# Patient Record
(demographics unavailable — no encounter records)

---

## 2024-10-28 NOTE — PHYSICAL EXAM
[Alert] : alert [No Acute Distress] : in no acute distress [Sclera] : the sclera and conjunctiva were normal [Normal Outer Ear/Nose] : the ears and nose were normal in appearance [Normal Appearance] : the appearance of the neck was normal [No Respiratory Distress] : no respiratory distress [No Acc Muscle Use] : no accessory muscle use [Auscultation Breath Sounds / Voice Sounds] : lungs were clear to auscultation bilaterally [Normal S1, S2] : normal S1 and S2 [Heart Rate And Rhythm] : heart rate was normal and rhythm regular [Bowel Sounds] : normal bowel sounds [Abdomen Tenderness] : non-tender [Abdomen Soft] : soft [No Spinal Tenderness] : no spinal tenderness [No Clubbing, Cyanosis] : no clubbing or cyanosis of the fingernails [Motor Tone] : muscle strength and tone were normal [Normal Color / Pigmentation] : normal skin color and pigmentation [No Focal Deficits] : no focal deficits [Normal Affect] : the affect was normal [Normal Mood] : the mood was normal [Normal Gait] : abnormal gait

## 2024-10-28 NOTE — ASSESSMENT
[FreeTextEntry1] : 1) HTN - improved on recheck today. monitor bp at home, sent bp monitor device to pharmacy. unclear if being treated.   2) Anxiety and depression - improving, continue with lexapro 10mg daily. information for geriatric psychiatrist provided today.   3) HLD - c/w simvastatin 20mg daily, lipid profile today.   4) hx of sciatica, fall and balance problems - c/w PT   5) Forgetfulness - Minicog today 2/5. Finidngs discussed with patient and son. Follow up with for cogntive assessment visit in one month. Records to be faxed over. Labs ordered today. Past imaging of the brain reviewed CT head 2015 , Mild to moderate periventricular and subcortical white matter lucencies probably represent chronic microvascular ischemic changes. There is mild cerebral volume loss with commensurate ventricular prominence.  6) Hepatitis C - tx, reviewed last imaging with no cirrhosis, and not detected in past labs. cmp ordered today.   Continue with healthy meals and snacks, and  physical activity as tolerated for weight maintenance. Prevnar 20 vaccine ordered. Lab ordered as below.  Orders and referral provided as below.   Patient is here for episodic care. All patient questions answered today and understood by patient. Henceforth, Patient to schedule follow up if new symptoms, questions, renewals or health concerns.

## 2024-10-28 NOTE — HISTORY OF PRESENT ILLNESS
[One fall no injury in past year] : Patient reported one fall in the past year without injury [NO] : No [0] : 2) Feeling down, depressed, or hopeless: Not at all (0) [PHQ-2 Negative - No further assessment needed] : PHQ-2 Negative - No further assessment needed [Patient/Caregiver not ready to engage] : , patient/caregiver not ready to engage [Full assistance needed] : Assistance needed managing medications [Walker] : walker [Smoke Detector] : smoke detector [FreeTextEntry1] : PCP Dr. Jude Child (Retired) Pain/Management: ?   Mr. AKSHAT CARRANZA is a 84 year old man with pmhx of Hepatitis C (tx 2019) , hearing loss with hearing aides, HTN,  HLD, OA, lumbar radiculopathy s/p injection,  physical deconditioning, ambulates with walker who presents to establish care. He is here with son Cornelius who helps provided history.    over a year ago. He endorses he initially had lost the purpose in life.  6 months ago, he started Lexapro 10mg daily by neurologist.  He states his mood and depressive thoughts have improved. He has wonderful family, who supports him. He noticed being forgetful at times. He does not recall if taking blood pressure medication and thyroid medications. He has no recent labs.   He needs to update HCP, form provided to discuss with family and fill out next visit.   Meds: Taking escitalopram 10mg, Simvastatin 20mg, Tylenol 1000mg BID, and daily mutlvitamin. He has accidentally taking twice simvastatin.   Social history: , lives alone. Use walker for ambulation.  [FreeTextEntry7] : needs supervision  [[id number]] : 0 [FreeTextEntry4] : HCP form provided today.

## 2024-11-25 NOTE — PHYSICAL EXAM
[General Appearance - Alert] : alert [General Appearance - In No Acute Distress] : in no acute distress [Oriented To Time, Place, And Person] : oriented to person, place, and time [Impaired Insight] : insight and judgment were intact [Affect] : the affect was normal [Person] : oriented to person [Place] : oriented to place [Time] : oriented to time [Remote Intact] : remote memory intact [Registration Intact] : recent registration memory intact [Concentration Intact] : normal concentrating ability [Visual Intact] : visual attention was ~T not ~L decreased [Naming Objects] : no difficulty naming common objects [Repeating Phrases] : no difficulty repeating a phrase [Writing A Sentence] : no difficulty writing a sentence [Fluency] : fluency intact [Comprehension] : comprehension intact [Reading] : reading intact [Current Events] : adequate knowledge of current events [Past History] : adequate knowledge of personal past history [Vocabulary] : adequate range of vocabulary [Date / Time ___ / 5] : date / time [unfilled] / 5 [Place ___ / 5] : place [unfilled] / 5 [Registration ___ / 3] : registration [unfilled] / 3 [Serial Sevens ___/5] : serial sevens [unfilled] / 5 [Naming 2 Objects ___ / 2] : naming two objects [unfilled] / 2 [Repeating a Sentence ___ / 1] : repeating a sentence [unfilled] / 1 [Cranial Nerves Optic (II)] : visual acuity intact bilaterally,  visual fields full to confrontation, pupils equal round and reactive to light [Cranial Nerves Oculomotor (III)] : extraocular motion intact [Cranial Nerves Trigeminal (V)] : facial sensation intact symmetrically [Cranial Nerves Facial (VII)] : face symmetrical [Cranial Nerves Vestibulocochlear (VIII)] : hearing was intact bilaterally [Cranial Nerves Glossopharyngeal (IX)] : tongue and palate midline [Cranial Nerves Accessory (XI - Cranial And Spinal)] : head turning and shoulder shrug symmetric [Cranial Nerves Hypoglossal (XII)] : there was no tongue deviation with protrusion [Motor Tone] : muscle tone was normal in all four extremities [Motor Strength] : muscle strength was normal in all four extremities [No Muscle Atrophy] : normal bulk in all four extremities [Sensation Tactile Decrease] : light touch was intact [Abnormal Walk] : normal gait [Balance] : balance was intact [Short Term Intact] : short term memory impaired [Total Score ___ / 30] : the patient achieved a score of [unfilled] /30 [Writing a Sentence ___ / 1] : write sentence [unfilled] / 1 [3-stage Verbal Command ___ / 3] : three-stage verbal command [unfilled] / 3 [Written Command ___ / 1] : written command [unfilled] / 1 [Copy a Design ___ / 1] : copy a design [unfilled] / 1 [Recall ___ / 3] : recall [unfilled] / 3 [Involuntary Movements] : no involuntary movements were seen [Motor Handedness Right-Handed] : the patient is right hand dominant [Motor Strength Upper Extremities Bilaterally] : strength was normal in both upper extremities [Motor Strength Lower Extremities Bilaterally] : strength was normal in both lower extremities [Romberg's Sign] : Romberg's sign was negtive [Limited Balance] : balance was intact [Past-pointing] : there was no past-pointing [Tremor] : no tremor present [1+] : Ankle jerk left 1+ [Plantar Reflex Right Only] : normal on the right [Plantar Reflex Left Only] : normal on the left [FreeTextEntry4] : Mental Status Exam Presidents: 2/5 Alternating Pattern: ok Spiral: ok Clock: 3/3 Repetition: ok  R/L discrimination on self and examiner: ok Cross-line commands: ok Praxis:  -Motor: ok  -Dynamic/Luria: ok -Ideomotor/Imitation: ok  -Ideational/writing/closing-in: ok -Dressing: ok. [Sclera] : the sclera and conjunctiva were normal [PERRL With Normal Accommodation] : pupils were equal in size, round, reactive to light, with normal accommodation [Extraocular Movements] : extraocular movements were intact [No APD] : no afferent pupillary defect [No SONIDO] : no internuclear ophthalmoplegia [Full Visual Field] : full visual field [Outer Ear] : the ears and nose were normal in appearance [Neck Appearance] : the appearance of the neck was normal [Edema] : there was no peripheral edema [] : no rash

## 2024-11-25 NOTE — ASSESSMENT
[FreeTextEntry1] : Assessment: 85yo RH WM with ongoing mild forgetfulness and depression. Exam benign, mostly amnestic. Concern for depression, but no SI. pt had SI and SP in the past (years), never materialized. He does have support system and looks fwd to spending time with family. Motor, minor limp RLE with sciatica type pain.  Diagnostic Impression: -forgetfulness -depression  Plan: Rule out reversible and vascular causes: -B vitamins, TFT, RPR -MRI brain w/o gael -basic inflammatory labs -Lyme serology -refer to psych (done by Sima as well).

## 2024-11-25 NOTE — HISTORY OF PRESENT ILLNESS
[FreeTextEntry1] : NO COVID. COVID VACCINE FULL.  PMH: 83yo RH WM with HLD, depression, here for STM loss.   HPI: Gradual forgetfulness over the last few months. Wife passed away 1y ago. Seems worse after that. Family very close to him, helping with any needs. But he manages well. Some getting lost @ driving.  -Sleep: ok   -Appetite: controlled, WW, stable weight   -Motor symptoms: R sciatica   -B/B: ok   -Psychiatric symptoms: some depression, once had SI (Rx OD) but did not proceed   -Functional status/Living Situation: lives alone   Briggs Index of [place] in Activities of Daily Livin. Bathing/Showerin 2. Dressin 3. Toiletin 4. Transferrin 5. Continence:1 6: Feedin   TOTAL:6   [place] Instrumental Activities of Daily Living: A. Ability to Use Telephone:1 B. Shoppin C. Food Preparation:1 D. Housekeepin E. Laundry:1 F. Transportation:1 G. 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

## 2024-12-31 NOTE — HISTORY OF PRESENT ILLNESS
[FreeTextEntry1] : NPV: spots of concern [de-identified] : Mr. AKSHAT CARRANZA is a 85 year old M who presents for:   1. Spots of concern: non-itchy, non-painful spots of unspecified age which patient would like to have evaluated    Skin Cancer Hx: No personal history of skin cancer Family Hx: no family history of skin cancer Soc Hx: no history of sunburns; no history of tanning bed use

## 2024-12-31 NOTE — PHYSICAL EXAM
[FreeTextEntry3] : Full body skin exam was performed. The patient is well-appearing, in no acute distress, alert and oriented x 3. Mood and affect are normal. A complete cutaneous examination of the scalp, face, neck, chest, abdomen, back, bilateral arms, bilateral legs, digits, nails, eyelids and lips reveals the following significant findings: Declined exam of buttocks - Scattered well demarcated stuck on appearing brown plaques on the trunk, extremities and face - Several scattered red partially blanching papules on the trunk and extremities. - Fairly uniform and regular brown macules and papules on the trunk and extremities. dermoscopy with benign features - Scattered poorly circumscribed red erythematous rough papules on the forehead and scalp - On the R nasal bridge, an red, eroded stuck-on appearing yellow papule  - On the R scrotum, soft well circumscribed subcutaneous nodules

## 2024-12-31 NOTE — ASSESSMENT
[FreeTextEntry1] : #Actinic Keratoses x5 - We have discussed the nature and usual course of these lesions. The risks/benefits/alternatives of cryo-destruction was explained to the patient which, include but are not limited to redness, swelling, pain, blistering, scar, discoloration of skin, and recurrence. The patient expressed understanding of these risks and agreed to the procedure. - Cryotherapy administered with 2 cycles to actinic keratoses (#5). - The procedure was well tolerated, without complication. We have discussed related skin care. - Consider Efudex at follow up visit pending clinical course given sun-damaged scalp/forehead; also has background of mild erosive pustular dermatosis, can consider clobetasol, but not too bothersome to patient  #Irritated Seborrheic Keratosis, x1, R nasal bridge - We have discussed the nature and usual course of these lesions. The risks/benefits/alternatives of cryo-destruction was explained to the patient which, include but are not limited to redness, swelling, pain, blistering, scar, discoloration of skin, and recurrence. The patient expressed understanding of these risks and agreed to the procedure. - Cryotherapy administered with 2 cycles to actinic keratoses (#1). - The procedure was well tolerated, without complication. We have discussed related skin care. - Patient to follow up for re-evaluation of lesion in 2-3 months given current appearance; most of lesion with dermoscopic features c/w SK but eroded area in direct contact with patient's glasses, will confirm resolved at [place]  #Subq nodules, scrotum chronic, stable - Favor scrotal cysts vs other; discussed the likely nature of these lesions. - Rtc if growing/changing or symptomatic. Histopath for definitive dx, can consider urology eval  #Cherry angiomas #Seborrheic Keratoses #Multiple melanocytic nevi, benign - education, counseling, reassurance - ABCDEs of melanoma reviewed, rtc sooner if changing  FBSE/Healthcare maintenance -Sun protection was discussed. The proper use of broad-spectrum UVA/UVB sunscreens with SPF 30 or greater was reviewed and the need for re-application after swimming or sweating or 2-3 hours was emphasized. We talked about judicious use of clothing and avoidance of peak periods of sun exposure. I made the patient aware of the need for year-round protection. ABCDEs of melanoma were also reviewed. -Self-skin checks were also reviewed, rtc sooner if changed noted -Counseled patient to monitor for changes - FBSE completed today, no lesions concerning for malignancy. Next FBSE due 1 year  Patient may follow up in 8-12 weeks, or sooner PRN if any changes, new or growing lesions

## 2025-02-28 NOTE — HISTORY OF PRESENT ILLNESS
[One fall no injury in past year] : Patient reported one fall in the past year without injury [Completely Independent] : Completely independent. [Patient is independent with] : bathing [] : managing medications [Independent] : managing finances [Walker] : walker [Smoke Detector] : smoke detector [NO] : No [0] : 2) Feeling down, depressed, or hopeless: Not at all (0) [PHQ-2 Negative - No further assessment needed] : PHQ-2 Negative - No further assessment needed [Patient/Caregiver not ready to engage] : , patient/caregiver not ready to engage [Designated Healthcare Proxy] : Designated healthcare proxy [Time Spent: ___ minutes] : Time Spent: [unfilled] minutes [1] : 2) Feeling down, depressed, or hopeless for several days (1) [PHQ-2 Positive] : PHQ-2 Positive [I have developed a follow-up plan documented below in the note.] : I have developed a follow-up plan documented below in the note. [FreeTextEntry1] : AKSHAT CARRANZA is an 86 yo man with PMH of  Hepatitis C (tx 2019) , hearing loss with hearing aides, HTN, HLD, OA, lumbar radiculopathy s/p injection, physical deconditioning and MCI who presents for post hospitalization follow up. Patient is accompanied by his son, Cornelius who provided collateral history.  PCP Dr. Jude Child (Retired) Pain/Management: ?   Hospital Course ([facility] 2/16/25-2/20/25): -presented for dizziness N/V c/f presyncope admitted for symptomatic orthostatic hypotension with N/V/D suggestive for viral gastroenteritis. -Leukocytosis with no fevers or abdominal pain likely reactive. No additional ep diarrhea and tolerated diet.  -GI PCR neg -CT ab/p shows colitis. -Repeat orthostatics after IVF are positive but not symptoms of dizziness. -ordered zofran prn, no other changes to meds -blood work only notable to WBC 12.9 with left shift on d/c -imaging showed Indeterminate liver lesion measuring 3.8 cm that is new since 2019. Evaluation with contrast enhanced abdominal MRI is recommended.  Since being home, patient states that he is feeling well. Has had no episodes of N/V/D. Has not required zofran. Does mention feeling down/depressed since the loss of his wife 1.5 years ago. States that he has little interest in anything and that he eats because he knows he has to but does not have any appetite. Denies SI. Patient is currently on lexapro 10mg qd. [[id number]] : 2 [AdvancecareDate] : 11/24 [FreeTextEntry4] : Assigns lisa Shields ([phone])as primary HCP and Luiz ([phone]) alternative.

## 2025-02-28 NOTE — REASON FOR VISIT
[Post Hospitalization] : a post hospitalization visit [Family Member] : family member [FreeTextEntry3] : Cornelius gonzalez

## 2025-02-28 NOTE — PHYSICAL EXAM
Addended by: DAWN JIANG on: 8/12/2019 09:01 AM     Modules accepted: Orders     [Alert] : alert [Sclera] : the sclera and conjunctiva were normal [Normal Outer Ear/Nose] : the ears and nose were normal in appearance [Normal Appearance] : the appearance of the neck was normal [No Respiratory Distress] : no respiratory distress [No Acc Muscle Use] : no accessory muscle use [Auscultation Breath Sounds / Voice Sounds] : lungs were clear to auscultation bilaterally [Normal S1, S2] : normal S1 and S2 [Heart Rate And Rhythm] : heart rate was normal and rhythm regular [Bowel Sounds] : normal bowel sounds [Abdomen Tenderness] : non-tender [Abdomen Soft] : soft [No Spinal Tenderness] : no spinal tenderness [No Clubbing, Cyanosis] : no clubbing or cyanosis of the fingernails [Motor Tone] : muscle strength and tone were normal [Normal Color / Pigmentation] : normal skin color and pigmentation [No Focal Deficits] : no focal deficits [Normal Affect] : the affect was normal [Normal Mood] : the mood was normal [___ / 5] : Visuospatial / Executive: [unfilled] / 5 [___ / 3] : Attention (Serial 7 subtraction): [unfilled] / 3 [___ / 1] : Fluency: [unfilled] / 1 [___ / 2] : Abstraction: [unfilled] / 2 [___ / 5] : Delayed Recall: [unfilled] / 5 [___ / 6] : Orientation: [unfilled] / 6 [EOMI] : extraocular movements were intact [Normal Oral Mucosa] : normal oral mucosa [Both Tympanic Membranes Were Examined] : both tympanic membranes were normal [Supple] : the neck was supple [Respiration, Rhythm And Depth] : normal respiratory rhythm and effort [Edema] : edema was not present [Cervical Lymph Nodes Enlarged Posterior Bilaterally] : posterior cervical [Cervical Lymph Nodes Enlarged Anterior Bilaterally] : anterior cervical, supraclavicular [MocaTotal] : 23 [FreeTextEntry1] : 11/13/2024 [Normal Gait] : abnormal gait

## 2025-02-28 NOTE — REVIEW OF SYSTEMS
[As noted in HPI] : as noted in HPI [Arthralgias] : arthralgias [Limb Pain] : limb pain [As Noted in HPI] : as noted in HPI [Depression] : depression [Negative] : Heme/Lymph [Feeling Poorly] : not feeling poorly [Feeling Tired] : not feeling tired [Discharge From Eyes] : no purulent discharge from the eyes [Dry Eyes] : no dryness of the eyes [Nasal Discharge] : no nasal discharge [Sore Throat] : no sore throat [Chest Pain] : no chest pain [Palpitations] : no palpitations [Shortness Of Breath] : no shortness of breath [Wheezing] : no wheezing [Cough] : no cough [SOB on Exertion] : no shortness of breath during exertion [Abdominal Pain] : no abdominal pain [Vomiting] : no vomiting [Constipation] : no constipation [Diarrhea] : no diarrhea [Dysuria] : no dysuria [Skin Wound] : no skin wound [Dizziness] : no dizziness [Suicidal] : not suicidal [Anxiety] : no anxiety

## 2025-02-28 NOTE — DATA REVIEWED
[FreeTextEntry1] : ACC: [id number]     EXAM:  CT ABDOMEN AND PELVIS IC   ORDERED BY:  JOHANNA GALEANO  PROCEDURE DATE:  02/19/2025    INTERPRETATION:  CLINICAL INFORMATION: Nausea and vomiting, loose stools, lower abdominal pain.  COMPARISON: CT abdomen and pelvis 1/12/2019.  CONTRAST/COMPLICATIONS: IV Contrast: Omnipaque 350  90 cc administered   10 cc discarded Oral Contrast: NONE   PROCEDURE: CT of the Abdomen and Pelvis was performed. Sagittal and coronal reformats were performed.  FINDINGS: LOWER CHEST: Right middle lobe nodule measuring 4 mm (3:5). Mild bibasilar atelectasis. Small air-filled cystic spaces in the posterior periphery of the bilateral lower lobes. Coronary artery calcification. Mild bilateral gynecomastia.  LIVER: Indeterminate lesion demonstrating predominantly mild hypodensity with mild peripheral hyperattenuation located in segment 4b, measures 3.8 x 3.0 cm (3:42), new since 1/12/2019. A focus of hyperattenuation in segment 5/6 measuring 1.6 cm is more conspicuous since 1/12/2019, but is not significantly changed in size (3:43). BILE DUCTS: Normal caliber. GALLBLADDER: Within normal limits. SPLEEN: Within normal limits. PANCREAS: Within normal limits. ADRENALS: Within normal limits. KIDNEYS/URETERS: Bilateral renal cysts and subcentimeter hypodense foci that are too small to characterize. No hydronephrosis.  BLADDER: Bladder stone measuring 8 mm. REPRODUCTIVE ORGANS: Enlarged prostate.  BOWEL: Colonic diverticulosis. Moderate to marked mural thickening extending from the splenic flexure to the mid sigmoid colon with adjacent fat infiltration, concerning for colitis. No evidence of pneumatosis. No bowel obstruction. PERITONEUM/RETROPERITONEUM: Trace ascites. VESSELS: Atherosclerotic changes. No evidence of portal or mesenteric venous gas. LYMPH NODES: No lymphadenopathy. ABDOMINAL WALL: Small fat-containing umbilical hernia. Small fat-containing right inguinal hernia. BONES: Degenerative changes. Grade 1 retrolisthesis of L1 over L2. Grade 1 anterolisthesis of L4 over L5.  IMPRESSION: *  Moderate to marked mural thickening with adjacent fat infiltration extending from the splenic flexure to the mid sigmoid colon, concerning for colitis. *  Indeterminate liver lesion measuring 3.8 cm that is new since 2019. Evaluation with contrast enhanced abdominal MRI is recommended.     --- End of Report ---     JOHN BUCK MD; Resident Radiologist This document has been electronically signed. MÓNICA MELENDEZ MD; Attending Radiologist This document has been electronically signed. Feb 19 2025  3:40PM  ACC: [id number]     EXAM:  CT ANGIO BRAIN (W)AW IC   ORDERED BY: DEVYN TIDWELL  ACC: [id number]     EXAM:  CT ANGIO NECK (W)AW IC   ORDERED BY: DEVYN TIDWELL  ACC: [id number]     EXAM:  CT BRAIN   ORDERED BY: DEVYN TIDWELL  PROCEDURE DATE:  02/16/2025    INTERPRETATION:  CLINICAL INFORMATION: Lightheadedness/dizziness with vomiting. Evaluate for CVA  COMPARISON: CT head 8/3/2015  CONTRAST: IV Contrast: Omnipaque 300 70 cc administered  30 cc discarded (accession [id number]), 30 cc discarded (accession [id number]), 30 30 cc discarded (accession [id number]) .  TECHNIQUE: CT BRAIN: Serial axial images were obtained from the skull base to the vertex without the use of contrast.  CTA NECK/HEAD: After the intravenous power injection of non-ionic contrast material, serial thin sections were obtained through the cervical and intracranial circulation on a multislice CT scanner. Axial, Coronal and Sagittal MIP reformatted images were obtained. 3D reconstruction was also performed.  FINDINGS:  CT BRAIN:  VENTRICLES AND SULCI: Normal in size and configuration. INTRA-AXIAL: No mass effect, acute hemorrhage, or midline shift. Gray-white matter differentiation is grossly preserved. There are confluent periventricular and subcortical white matter hypodensities, consistent with moderate microvascular type changes. EXTRA-AXIAL: No mass or fluid collection. Basal cisterns are normal in appearance.  VISUALIZED SINUSES:  Clear. TYMPANOMASTOID CAVITIES:  Clear. VISUALIZED ORBITS: Bilateral lens replacement. CALVARIUM: Intact.  MISCELLANEOUS: None.   CTA NECK:  AORTIC ARCH AND VISUALIZED GREAT VESSELS: Within normal limits.  RIGHT: COMMON CAROTID ARTERY: No significant stenosis to the carotid bifurcation. INTERNAL CAROTID ARTERY: No significant stenosis based on NASCET criteria. VERTEBRAL ARTERY: Normal in course and caliber to the intracranial circulation.  LEFT: COMMON CAROTID ARTERY: No significant stenosis to the carotid bifurcation. INTERNAL CAROTID ARTERY: No significant stenosis based on NASCET criteria. VERTEBRAL ARTERY: Normal in course and caliber to the intracranial circulation.  VISUALIZED LUNGS: Clear.  MISCELLANEOUS: None.  CAROTID STENOSIS REFERENCE: Percent (%) stenosis is expressed in terms of NASCET Criteria. (NASCET = 100x1-(N/D)). N=greatest narrowing. D=normal distal diameter - MILD = <50% stenosis. - MODERATE = 50-69% stenosis. - SEVERE = 70-89% stenosis. - HAIRLINE/CRITICAL = 90-99% stenosis. - OCCLUDED = 100% stenosis.   CTA HEAD:  INTERNAL CAROTID ARTERIES: Bilateral petrous, precavernous, cavernous, and supraclinoid regions are patent without significant stenosis.  [place]: No aneurysm identified. Tiny aneurysms can be beyond the resolution of CTA technique.  ANTERIOR CEREBRAL ARTERIES: No significant stenosis or occlusion. MIDDLE CEREBRAL ARTERIES: No significant stenosis or occlusion. POSTERIOR CEREBRAL ARTERIES: No significant stenosis or occlusion.  DISTAL VERTEBRAL / BASILAR ARTERIES: No significant stenosis or occlusion.  VENOUS STRUCTURES: Visualized superficial and deep venous structures appear patent.  MISCELLANEOUS: No other vascular abnormality is seen.   IMPRESSION:  CT HEAD: No acute intracranial hemorrhage, mass effect, or evidence of acute vascular territorial infarction. If clinical symptoms persist or worsen, more sensitive evaluation with brain MRI may be obtained, if no contraindications exist.  CTA NECK: No evidence of significant stenosis or occlusion.  CTA HEAD: No large vessel occlusion, significant stenosis or vascular abnormality identified.    --- End of Report ---     MEME CISNEROS MD; Resident Radiologist This document has been electronically signed. MIYA PHILLIPS MD; Attending Radiologist This document has been electronically signed. Feb 16 2025  9:37PM  CC: [id number]     EXAM:  XR CHEST AP OR PA 1V   ORDERED BY: DEVYN TIDWELL  PROCEDURE DATE:  02/16/2025    INTERPRETATION:  EXAMINATION: XR CHEST  CLINICAL INDICATION: Dizziness and vomiting. Evaluate for pneumonia  TECHNIQUE: Frontal portable view of the chest was obtained.  COMPARISON: Chest x-ray 1/23/2024  FINDINGS:  The heart is normal in size. No focal consolidation. There is no pneumothorax or pleural effusion. No acute bony abnormality.  IMPRESSION: Clear lungs.  --- End of Report ---     MEME CISNEROS MD; Resident Radiologist This document has been electronically signed. NINA GAMEZ MD; Attending Radiologist This document has been electronically signed. Feb 17 2025  8:41AM

## 2025-02-28 NOTE — HISTORY OF PRESENT ILLNESS
[One fall no injury in past year] : Patient reported one fall in the past year without injury [Completely Independent] : Completely independent. [Patient is independent with] : bathing [] : managing medications [Independent] : managing finances [Walker] : walker [Smoke Detector] : smoke detector [NO] : No [0] : 2) Feeling down, depressed, or hopeless: Not at all (0) [PHQ-2 Negative - No further assessment needed] : PHQ-2 Negative - No further assessment needed [Patient/Caregiver not ready to engage] : , patient/caregiver not ready to engage [Designated Healthcare Proxy] : Designated healthcare proxy [Time Spent: ___ minutes] : Time Spent: [unfilled] minutes [1] : 2) Feeling down, depressed, or hopeless for several days (1) [PHQ-2 Positive] : PHQ-2 Positive [I have developed a follow-up plan documented below in the note.] : I have developed a follow-up plan documented below in the note. [FreeTextEntry1] : AKSHAT CARRANZA is an 84 yo man with PMH of  Hepatitis C (tx 2019) , hearing loss with hearing aides, HTN, HLD, OA, lumbar radiculopathy s/p injection, physical deconditioning and MCI who presents for post hospitalization follow up. Patient is accompanied by his son, Cornelius who provided collateral history.  PCP Dr. Jude Child (Retired) Pain/Management: ?   Hospital Course ([facility] 2/16/25-2/20/25): -presented for dizziness N/V c/f presyncope admitted for symptomatic orthostatic hypotension with N/V/D suggestive for viral gastroenteritis. -Leukocytosis with no fevers or abdominal pain likely reactive. No additional ep diarrhea and tolerated diet.  -GI PCR neg -CT ab/p shows colitis. -Repeat orthostatics after IVF are positive but not symptoms of dizziness. -ordered zofran prn, no other changes to meds -blood work only notable to WBC 12.9 with left shift on d/c -imaging showed Indeterminate liver lesion measuring 3.8 cm that is new since 2019. Evaluation with contrast enhanced abdominal MRI is recommended.  Since being home, patient states that he is feeling well. Has had no episodes of N/V/D. Has not required zofran. Does mention feeling down/depressed since the loss of his wife 1.5 years ago. States that he has little interest in anything and that he eats because he knows he has to but does not have any appetite. Denies SI. Patient is currently on lexapro 10mg qd. [[id number]] : 2 [AdvancecareDate] : 11/24 [FreeTextEntry4] : Assigns lisa Shields ([phone])as primary HCP and Luiz ([phone]) alternative.

## 2025-02-28 NOTE — DATA REVIEWED
[FreeTextEntry1] : ACC: [id number]     EXAM:  CT ABDOMEN AND PELVIS IC   ORDERED BY:  JOHANNA GALEANO  PROCEDURE DATE:  02/19/2025    INTERPRETATION:  CLINICAL INFORMATION: Nausea and vomiting, loose stools, lower abdominal pain.  COMPARISON: CT abdomen and pelvis 1/12/2019.  CONTRAST/COMPLICATIONS: IV Contrast: Omnipaque 350  90 cc administered   10 cc discarded Oral Contrast: NONE   PROCEDURE: CT of the Abdomen and Pelvis was performed. Sagittal and coronal reformats were performed.  FINDINGS: LOWER CHEST: Right middle lobe nodule measuring 4 mm (3:5). Mild bibasilar atelectasis. Small air-filled cystic spaces in the posterior periphery of the bilateral lower lobes. Coronary artery calcification. Mild bilateral gynecomastia.  LIVER: Indeterminate lesion demonstrating predominantly mild hypodensity with mild peripheral hyperattenuation located in segment 4b, measures 3.8 x 3.0 cm (3:42), new since 1/12/2019. A focus of hyperattenuation in segment 5/6 measuring 1.6 cm is more conspicuous since 1/12/2019, but is not significantly changed in size (3:43). BILE DUCTS: Normal caliber. GALLBLADDER: Within normal limits. SPLEEN: Within normal limits. PANCREAS: Within normal limits. ADRENALS: Within normal limits. KIDNEYS/URETERS: Bilateral renal cysts and subcentimeter hypodense foci that are too small to characterize. No hydronephrosis.  BLADDER: Bladder stone measuring 8 mm. REPRODUCTIVE ORGANS: Enlarged prostate.  BOWEL: Colonic diverticulosis. Moderate to marked mural thickening extending from the splenic flexure to the mid sigmoid colon with adjacent fat infiltration, concerning for colitis. No evidence of pneumatosis. No bowel obstruction. PERITONEUM/RETROPERITONEUM: Trace ascites. VESSELS: Atherosclerotic changes. No evidence of portal or mesenteric venous gas. LYMPH NODES: No lymphadenopathy. ABDOMINAL WALL: Small fat-containing umbilical hernia. Small fat-containing right inguinal hernia. BONES: Degenerative changes. Grade 1 retrolisthesis of L1 over L2. Grade 1 anterolisthesis of L4 over L5.  IMPRESSION: *  Moderate to marked mural thickening with adjacent fat infiltration extending from the splenic flexure to the mid sigmoid colon, concerning for colitis. *  Indeterminate liver lesion measuring 3.8 cm that is new since 2019. Evaluation with contrast enhanced abdominal MRI is recommended.     --- End of Report ---     JOHN BUCK MD; Resident Radiologist This document has been electronically signed. MÓNICA MELENDEZ MD; Attending Radiologist This document has been electronically signed. Feb 19 2025  3:40PM  ACC: [id number]     EXAM:  CT ANGIO BRAIN (W)AW IC   ORDERED BY: DEVYN TIDWELL  ACC: [id number]     EXAM:  CT ANGIO NECK (W)AW IC   ORDERED BY: DEVYN TIDWELL  ACC: [id number]     EXAM:  CT BRAIN   ORDERED BY: DEVYN TIDWELL  PROCEDURE DATE:  02/16/2025    INTERPRETATION:  CLINICAL INFORMATION: Lightheadedness/dizziness with vomiting. Evaluate for CVA  COMPARISON: CT head 8/3/2015  CONTRAST: IV Contrast: Omnipaque 300 70 cc administered  30 cc discarded (accession [id number]), 30 cc discarded (accession [id number]), 30 30 cc discarded (accession [id number]) .  TECHNIQUE: CT BRAIN: Serial axial images were obtained from the skull base to the vertex without the use of contrast.  CTA NECK/HEAD: After the intravenous power injection of non-ionic contrast material, serial thin sections were obtained through the cervical and intracranial circulation on a multislice CT scanner. Axial, Coronal and Sagittal MIP reformatted images were obtained. 3D reconstruction was also performed.  FINDINGS:  CT BRAIN:  VENTRICLES AND SULCI: Normal in size and configuration. INTRA-AXIAL: No mass effect, acute hemorrhage, or midline shift. Gray-white matter differentiation is grossly preserved. There are confluent periventricular and subcortical white matter hypodensities, consistent with moderate microvascular type changes. EXTRA-AXIAL: No mass or fluid collection. Basal cisterns are normal in appearance.  VISUALIZED SINUSES:  Clear. TYMPANOMASTOID CAVITIES:  Clear. VISUALIZED ORBITS: Bilateral lens replacement. CALVARIUM: Intact.  MISCELLANEOUS: None.   CTA NECK:  AORTIC ARCH AND VISUALIZED GREAT VESSELS: Within normal limits.  RIGHT: COMMON CAROTID ARTERY: No significant stenosis to the carotid bifurcation. INTERNAL CAROTID ARTERY: No significant stenosis based on NASCET criteria. VERTEBRAL ARTERY: Normal in course and caliber to the intracranial circulation.  LEFT: COMMON CAROTID ARTERY: No significant stenosis to the carotid bifurcation. INTERNAL CAROTID ARTERY: No significant stenosis based on NASCET criteria. VERTEBRAL ARTERY: Normal in course and caliber to the intracranial circulation.  VISUALIZED LUNGS: Clear.  MISCELLANEOUS: None.  CAROTID STENOSIS REFERENCE: Percent (%) stenosis is expressed in terms of NASCET Criteria. (NASCET = 100x1-(N/D)). N=greatest narrowing. D=normal distal diameter - MILD = <50% stenosis. - MODERATE = 50-69% stenosis. - SEVERE = 70-89% stenosis. - HAIRLINE/CRITICAL = 90-99% stenosis. - OCCLUDED = 100% stenosis.   CTA HEAD:  INTERNAL CAROTID ARTERIES: Bilateral petrous, precavernous, cavernous, and supraclinoid regions are patent without significant stenosis.  [place]: No aneurysm identified. Tiny aneurysms can be beyond the resolution of CTA technique.  ANTERIOR CEREBRAL ARTERIES: No significant stenosis or occlusion. MIDDLE CEREBRAL ARTERIES: No significant stenosis or occlusion. POSTERIOR CEREBRAL ARTERIES: No significant stenosis or occlusion.  DISTAL VERTEBRAL / BASILAR ARTERIES: No significant stenosis or occlusion.  VENOUS STRUCTURES: Visualized superficial and deep venous structures appear patent.  MISCELLANEOUS: No other vascular abnormality is seen.   IMPRESSION:  CT HEAD: No acute intracranial hemorrhage, mass effect, or evidence of acute vascular territorial infarction. If clinical symptoms persist or worsen, more sensitive evaluation with brain MRI may be obtained, if no contraindications exist.  CTA NECK: No evidence of significant stenosis or occlusion.  CTA HEAD: No large vessel occlusion, significant stenosis or vascular abnormality identified.    --- End of Report ---     MEME CISNEROS MD; Resident Radiologist This document has been electronically signed. MIYA PHILLIPS MD; Attending Radiologist This document has been electronically signed. Feb 16 2025  9:37PM  CC: [id number]     EXAM:  XR CHEST AP OR PA 1V   ORDERED BY: DEVYN TIDWELL  PROCEDURE DATE:  02/16/2025    INTERPRETATION:  EXAMINATION: XR CHEST  CLINICAL INDICATION: Dizziness and vomiting. Evaluate for pneumonia  TECHNIQUE: Frontal portable view of the chest was obtained.  COMPARISON: Chest x-ray 1/23/2024  FINDINGS:  The heart is normal in size. No focal consolidation. There is no pneumothorax or pleural effusion. No acute bony abnormality.  IMPRESSION: Clear lungs.  --- End of Report ---     MEME CISNEROS MD; Resident Radiologist This document has been electronically signed. NINA GAMEZ MD; Attending Radiologist This document has been electronically signed. Feb 17 2025  8:41AM

## 2025-02-28 NOTE — PHYSICAL EXAM
[Alert] : alert [Sclera] : the sclera and conjunctiva were normal [Normal Outer Ear/Nose] : the ears and nose were normal in appearance [Normal Appearance] : the appearance of the neck was normal [No Respiratory Distress] : no respiratory distress [No Acc Muscle Use] : no accessory muscle use [Auscultation Breath Sounds / Voice Sounds] : lungs were clear to auscultation bilaterally [Normal S1, S2] : normal S1 and S2 [Heart Rate And Rhythm] : heart rate was normal and rhythm regular [Bowel Sounds] : normal bowel sounds [Abdomen Tenderness] : non-tender [Abdomen Soft] : soft [No Spinal Tenderness] : no spinal tenderness [No Clubbing, Cyanosis] : no clubbing or cyanosis of the fingernails [Motor Tone] : muscle strength and tone were normal [Normal Color / Pigmentation] : normal skin color and pigmentation [No Focal Deficits] : no focal deficits [Normal Affect] : the affect was normal [Normal Mood] : the mood was normal [___ / 5] : Visuospatial / Executive: [unfilled] / 5 [___ / 3] : Attention (Serial 7 subtraction): [unfilled] / 3 [___ / 1] : Fluency: [unfilled] / 1 [___ / 2] : Abstraction: [unfilled] / 2 [___ / 5] : Delayed Recall: [unfilled] / 5 [___ / 6] : Orientation: [unfilled] / 6 [EOMI] : extraocular movements were intact [Normal Oral Mucosa] : normal oral mucosa [Both Tympanic Membranes Were Examined] : both tympanic membranes were normal [Supple] : the neck was supple [Respiration, Rhythm And Depth] : normal respiratory rhythm and effort [Edema] : edema was not present [Cervical Lymph Nodes Enlarged Posterior Bilaterally] : posterior cervical [Cervical Lymph Nodes Enlarged Anterior Bilaterally] : anterior cervical, supraclavicular [MocaTotal] : 23 [FreeTextEntry1] : 11/13/2024 [Normal Gait] : abnormal gait
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
## 2025-03-27 NOTE — REVIEW OF SYSTEMS
[Diarrhea: Grade 0] : Diarrhea: Grade 0 [Negative] : Allergic/Immunologic [Depression] : depression [de-identified] : memory issues

## 2025-03-27 NOTE — REASON FOR VISIT
[Initial Consultation] : an initial consultation [FreeTextEntry2] : Suspicious liver lesion concerning for cholangiocarcinoma

## 2025-03-27 NOTE — REVIEW OF SYSTEMS
[Diarrhea: Grade 0] : Diarrhea: Grade 0 [Negative] : Allergic/Immunologic [Depression] : depression [de-identified] : memory issues

## 2025-03-28 NOTE — HISTORY OF PRESENT ILLNESS
[FreeTextEntry1] : fu AKs, penile growth  [de-identified] : AKSHAT CARRANZA is a 85-year-old male who presents for eval of:   1. AKs on the scalp, LV 12/2024 had FBSE and several AKs tx with LN2  2. Lesion in the groin, present ~1 year, slightly itchy   No personal or family hx skin CA

## 2025-03-28 NOTE — ASSESSMENT
[FreeTextEntry1] : # Actinic Keratoses, scalp - Discussed the nature and usual course of these lesions  - Discussed treatment options and r/b/a of treatment  - Will treat with LN2 today    Procedure note: Cryotherapy  Verbal consent obtained. The risks/benefits/alternatives discussed including but not limited to risk of pain, inflammatory and blistering reaction, infection, risk of permanent scar and/or dyspigmentation, recurrence, possible lack of efficacy and need for repeat treatments. Sites confirmed. 4 lesion(s) treated with cryotherapy: liquid nitrogen x2 rapid freeze-slow thaw cycles. Discussed wound care instructions. Patient tolerated well with no immediate complications.  # Neoplasms of Uncertain Behavior 1) Left scrotum, ddx condyloma vs SK 2) Vertex scalp, ddx hypertrophic AK vs SCCis - Recommend skin biopsy to help confirm diagnosis. See procedure note below - Wound care instructions given. Recommended OTC vaseline for wound care  Procedure: Shave bx  1) Left scrotum 2) Vertex scalp  The risks/benefits/alternatives of skin biopsy were explained to the patient, which include and are not limited to bleeding, infection, scarring or discoloration of skin, and recurrence of lesion. Patient expressed understanding of these risks and provided consent to the procedure. Time out with verification of patient and lesion site was performed. Site was prepped with rubbing alcohol, lidocaine with epinephrine was injected for anesthesia, and biopsy was performed. Specimen sent to path. Procedure was without complication and well tolerated. Wound care was discussed.  #Lentigines #SKs - education, counseling, reassurance - otc spf 30+  RTC 6 months or sooner PRN pending bx results
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
## 2025-04-02 NOTE — REASON FOR VISIT
[Consultation] : a consultation visit [Home] : at home, [unfilled] , at the time of the visit. [Medical Office: ([facility])___] : at the medical office located in  [Verbal consent obtained from patient] : the patient, [unfilled] [This encounter was initiated by telehealth (audio with video) and converted to telephone (audio only)] : This encounter was initiated by telehealth (audio with video) and converted to telephone (audio only) [Technical] : patient unable to effectively utilize tele-video due to technical issues. [FreeTextEntry1] : targeted liver lesion biopsy

## 2025-04-02 NOTE — PHYSICAL EXAM
[Alert] : alert [No Acute Distress] : no acute distress [Well Nourished] : well nourished [Well Developed] : well developed [Healthy Appearance] : healthy appearance [Normal Voice/Communication] : normal voice communication [No Respiratory Distress] : no respiratory distress [Oriented x3] : oriented to person, place, and time [Normal Insight/Judgement] : insight and judgment were intact [Normal Affect] : the affect was normal [Normal Mood] : the mood was normal [Ambulatory and capable of all selfcare but unable to carry out any work activities] : Ambulatory and capable of all selfcare but unable to carry out any work activities. Up and about more than 50% of waking hours [de-identified] : + memory issues since wife passed, forgetful about timing of events

## 2025-04-02 NOTE — ASSESSMENT
[Other: _____] : [unfilled] [FreeTextEntry1] : This is an 86 y/o M with hx of HTN, HLD, OA, lumbar radiculopathy, hearing loss with hearing aids, depression, Hepatitis C (tx 2019 with Harvyessenia) with image identified liver mass concerning for malignancy, who presents to IR for biopsy consultation.  # Hepatic Lesion - pt with hospitalized for dizziness, nausea and vomiting in 2/2025, incidentally noted to have liver lesion on workup - 2/19/25 CT demonstrated: > Indeterminate liver lesion measuring 3.8 cm that is new since 2019. Evaluation with contrast enhanced abdominal MRI is recommended.  - 3/14/25 MR a/p demonstrated:  > lesion in segments 4A/B corresponding with the lesion on prior CT demonstrates persistent peripheral enhancement with restricted diffusion. Findings are suspicious for cholangiocarcinoma. The differential may also include a single metastasis. - presents for outpatient image guided liver biopsy consult, for definitive diagnosis to aid in formulating plan of care, as referred by Dr Good - imaging was reviewed a great length with the pt and son Cornelius - procedural benefits, alternatives, risks (bleeding & infection) & expected postprocedural course were discussed at great length -  A cytology tech will be present to evaluate bx samples to determine if adequate tissue is present for diagnosis - biopsy results typically take 3-5 business days or longer if special testing is performed & results will be sent to your referring Dr who will review them with you - Labs from 3/27/25 - cmp, inr - have been reviewed, will need CBC-ordered tbd preop - the pt expressed that he is open to exploring treatment options should bx be positive for malignancy - takes baby aspirin prophylactically (denies MI, TIA, CVA) for over 30 years, advised to hold x 5days preop - will val arrangements - f.u with Dr. Good for results and plan of care   # Memory issues - pt a&ox3 with some forgetfulness regarding timing of medical events - Wilfredo Shields is HCP-present for consult and advised to be present DOS  I have provided the patient the opportunity to ask questions and have answered them to their satisfaction.  They are encouraged to contact our office with any further questions, concerns, or issues.

## 2025-04-02 NOTE — REASON FOR VISIT
[Consultation] : a consultation visit [Home] : at home, [unfilled] , at the time of the visit. [Medical Office: ([facility])___] : at the medical office located in  [Verbal consent obtained from patient] : the patient, [unfilled] [This encounter was initiated by telehealth (audio with video) and converted to telephone (audio only)] : This encounter was initiated by telehealth (audio with video) and converted to telephone (audio only) [Technical] : patient unable to effectively utilize tele-video due to technical issues. [FreeTextEntry1] : targeted liver lesion biopsy

## 2025-04-02 NOTE — DATA REVIEWED
[FreeTextEntry1] : ACC: [id number] EXAM: CT ABDOMEN AND PELVIS IC ORDERED BY: JOHANNA GALEANO  PROCEDURE DATE: 02/19/2025    INTERPRETATION: CLINICAL INFORMATION: Nausea and vomiting, loose stools, lower abdominal pain.  COMPARISON: CT abdomen and pelvis 1/12/2019.  CONTRAST/COMPLICATIONS: IV Contrast: Omnipaque 350 90 cc administered 10 cc discarded Oral Contrast: NONE   PROCEDURE: CT of the Abdomen and Pelvis was performed. Sagittal and coronal reformats were performed.  FINDINGS: LOWER CHEST: Right middle lobe nodule measuring 4 mm (3:5). Mild bibasilar atelectasis. Small air-filled cystic spaces in the posterior periphery of the bilateral lower lobes. Coronary artery calcification. Mild bilateral gynecomastia.  LIVER: Indeterminate lesion demonstrating predominantly mild hypodensity with mild peripheral hyperattenuation located in segment 4b, measures 3.8 x 3.0 cm (3:42), new since 1/12/2019. A focus of hyperattenuation in segment 5/6 measuring 1.6 cm is more conspicuous since 1/12/2019, but is not significantly changed in size (3:43). BILE DUCTS: Normal caliber. GALLBLADDER: Within normal limits. SPLEEN: Within normal limits. PANCREAS: Within normal limits. ADRENALS: Within normal limits. KIDNEYS/URETERS: Bilateral renal cysts and subcentimeter hypodense foci that are too small to characterize. No hydronephrosis.  BLADDER: Bladder stone measuring 8 mm. REPRODUCTIVE ORGANS: Enlarged prostate.  BOWEL: Colonic diverticulosis. Moderate to marked mural thickening extending from the splenic flexure to the mid sigmoid colon with adjacent fat infiltration, concerning for colitis. No evidence of pneumatosis. No bowel obstruction. PERITONEUM/RETROPERITONEUM: Trace ascites. VESSELS: Atherosclerotic changes. No evidence of portal or mesenteric venous gas. LYMPH NODES: No lymphadenopathy. ABDOMINAL WALL: Small fat-containing umbilical hernia. Small fat-containing right inguinal hernia. BONES: Degenerative changes. Grade 1 retrolisthesis of L1 over L2. Grade 1 anterolisthesis of L4 over L5.  IMPRESSION: * Moderate to marked mural thickening with adjacent fat infiltration extending from the splenic flexure to the mid sigmoid colon, concerning for colitis. * Indeterminate liver lesion measuring 3.8 cm that is new since 2019. Evaluation with contrast enhanced abdominal MRI is recommended.     --- End of Report ---     JOHN BUCK MD; Resident Radiologist This document has been electronically signed. MÓNICA MELENDEZ MD; Attending Radiologist This document has been electronically signed. Feb 19 2025 3:40PM
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
## 2025-04-02 NOTE — DATA REVIEWED
[FreeTextEntry1] : ACC: [id number] EXAM: CT ABDOMEN AND PELVIS IC ORDERED BY: JOHANNA GALEANO  PROCEDURE DATE: 02/19/2025    INTERPRETATION: CLINICAL INFORMATION: Nausea and vomiting, loose stools, lower abdominal pain.  COMPARISON: CT abdomen and pelvis 1/12/2019.  CONTRAST/COMPLICATIONS: IV Contrast: Omnipaque 350 90 cc administered 10 cc discarded Oral Contrast: NONE   PROCEDURE: CT of the Abdomen and Pelvis was performed. Sagittal and coronal reformats were performed.  FINDINGS: LOWER CHEST: Right middle lobe nodule measuring 4 mm (3:5). Mild bibasilar atelectasis. Small air-filled cystic spaces in the posterior periphery of the bilateral lower lobes. Coronary artery calcification. Mild bilateral gynecomastia.  LIVER: Indeterminate lesion demonstrating predominantly mild hypodensity with mild peripheral hyperattenuation located in segment 4b, measures 3.8 x 3.0 cm (3:42), new since 1/12/2019. A focus of hyperattenuation in segment 5/6 measuring 1.6 cm is more conspicuous since 1/12/2019, but is not significantly changed in size (3:43). BILE DUCTS: Normal caliber. GALLBLADDER: Within normal limits. SPLEEN: Within normal limits. PANCREAS: Within normal limits. ADRENALS: Within normal limits. KIDNEYS/URETERS: Bilateral renal cysts and subcentimeter hypodense foci that are too small to characterize. No hydronephrosis.  BLADDER: Bladder stone measuring 8 mm. REPRODUCTIVE ORGANS: Enlarged prostate.  BOWEL: Colonic diverticulosis. Moderate to marked mural thickening extending from the splenic flexure to the mid sigmoid colon with adjacent fat infiltration, concerning for colitis. No evidence of pneumatosis. No bowel obstruction. PERITONEUM/RETROPERITONEUM: Trace ascites. VESSELS: Atherosclerotic changes. No evidence of portal or mesenteric venous gas. LYMPH NODES: No lymphadenopathy. ABDOMINAL WALL: Small fat-containing umbilical hernia. Small fat-containing right inguinal hernia. BONES: Degenerative changes. Grade 1 retrolisthesis of L1 over L2. Grade 1 anterolisthesis of L4 over L5.  IMPRESSION: * Moderate to marked mural thickening with adjacent fat infiltration extending from the splenic flexure to the mid sigmoid colon, concerning for colitis. * Indeterminate liver lesion measuring 3.8 cm that is new since 2019. Evaluation with contrast enhanced abdominal MRI is recommended.     --- End of Report ---     JOHN BUCK MD; Resident Radiologist This document has been electronically signed. MÓNICA MELENDEZ MD; Attending Radiologist This document has been electronically signed. Feb 19 2025 3:40PM

## 2025-04-02 NOTE — PHYSICAL EXAM
[Alert] : alert [No Acute Distress] : no acute distress [Well Nourished] : well nourished [Well Developed] : well developed [Healthy Appearance] : healthy appearance [Normal Voice/Communication] : normal voice communication [No Respiratory Distress] : no respiratory distress [Oriented x3] : oriented to person, place, and time [Normal Insight/Judgement] : insight and judgment were intact [Normal Affect] : the affect was normal [Normal Mood] : the mood was normal [Ambulatory and capable of all selfcare but unable to carry out any work activities] : Ambulatory and capable of all selfcare but unable to carry out any work activities. Up and about more than 50% of waking hours [de-identified] : + memory issues since wife passed, forgetful about timing of events

## 2025-04-18 NOTE — PLAN
[Every ___ week(s)] : Psychotherapy: Every [unfilled] week(s) [Individual Therapy] : Individual Therapy [FreeTextEntry4] : Patient will develop a therapeutic alliance with therapist Patient will identify and process feelings of grief Patient will learn grounding tools to cope with depression Patient's son will be involved in patient's treatment

## 2025-04-18 NOTE — HISTORY OF PRESENT ILLNESS
[FreeTextEntry1] : Patient is an 85-year-old   who sought help as per his son who is his power of . Patient has memory loss which is very upsetting to him. He lives alone in [place].  Substance use history: Patient stopped drinking in  after his wife . Education: Two years at [employer]. Majored in science. Work history: Patient worked for a [employer] until the . Then, the business became obsolete. Patient retired.  Family dynamics  Patient has been  since . Patient lives alone.  Marital status: . Children: 4 sons. 2 sons were adopted by patient from his second wife. Financial status: unknown    [FreeTextEntry2] : Patient denied history of mental health treatment or psychiatry. Patient stated that he felt suicidal last year because he was unable to volunteer after his wife's death. Patient felt "useless". His friend referred him to a doctor who prescribed an anti- depressant. Patient is unable to recall the name of it.  [FreeTextEntry3] : Anti-depressant, patient does not recall the name

## 2025-04-18 NOTE — RISK ASSESSMENT
[Clinical Interview] : Clinical Interview [No] : No [None in the patient's lifetime] : None in the patient's lifetime [No known risk factors] : No known risk factors [Residential stability] : residential stability [Yes] : yes [TextBox_32] : Patient felt suicidal last year when he was unable to volunteer. He was grieving his wife's death. Patient had no plan or intent to follow through. No past suicide attempts, as per patient.  [de-identified] : Patient denied access to guns.

## 2025-04-18 NOTE — FAMILY HISTORY
[FreeTextEntry1] : Patient was born and raised in [place] by his parents. Patient has one brother who is . His brother had cardiac issues and  in . His mother  at age 102 and his father  at age 92. Patient denied family history of suicide.

## 2025-04-18 NOTE — PSYCHOSOCIAL ASSESSMENT
[_____] : Last Use: [unfilled]  [No] : Have you ever experienced this type of event? No [FreeTextEntry1] : Two years at [employer] [FreeTextEntry2] : Patient misses his wife. His son Cornelius is his power of . Patient has four sons. He adopted his second wife's sons. Patient  his first wife in 1974.  [FreeTextEntry3] : Patient has memory loss.

## 2025-04-18 NOTE — PHYSICAL EXAM
[Average] : average [Cooperative] : cooperative [Depressed] : depressed [Constricted] : constricted [Clear] : clear [Linear/Goal Directed] : linear/goal directed [WNL] : within normal limits [FreeTextEntry7] : No SI or HI [de-identified] : No auditory or visual hallucinations

## 2025-04-18 NOTE — REASON FOR VISIT
[OK  to leave message] : OK  to leave message [Self-Referred] : Self-Referred [Patient] : Patient [FreeTextEntry2] : [phone] [FreeTextEntry1] : Depression and grief over losing wife in 2023. Memory loss.

## 2025-04-21 NOTE — REASON FOR VISIT
[Follow-Up Visit] : a follow-up [FreeTextEntry2] : Suspicious liver lesion concerning for cholangiocarcinoma

## 2025-04-21 NOTE — REVIEW OF SYSTEMS
[Diarrhea: Grade 0] : Diarrhea: Grade 0 [Depression] : depression [Negative] : Allergic/Immunologic [de-identified] : memory issues

## 2025-04-21 NOTE — HISTORY OF PRESENT ILLNESS
[Disease: _____________________] : Disease: [unfilled] [AJCC Stage: ____] : AJCC Stage: [unfilled] [de-identified] : 84 y/o M PMHx Hepatitis C (tx 2019 with Deja) referred for evaluation of a liver mass concerning for malignancy.   PMHx: hearing loss with hearing aides, HTN, HLD, OA, lumbar radiculopathy  Asim presented to ED on 25 for dizziness, nausea and vomiting. CT AP 25 showed Moderate to marked mural thickening with adjacent fat infiltration extending from the splenic flexure to the mid sigmoid colon, concerning for colitis and indeterminate liver lesion measuring 3.8 cm that is new since 2019 w/ recommendations for contrast enhanced MRI. Patient's PCP ordered MRI outpatient that was performed 3/13/25 showing Lesion in segments 4A/B corresponding with the lesion on prior CT demonstrates persistent peripheral enhancement with restricted diffusion. Findings are suspicious for cholangiocarcinoma. The differential may also include a single metastasis. A 1.6 cm small segment 6 hemangioma is also noted. Pt denies any change in BMs, appetite or energy level. He has been struggling with depression and memory issues since his wife  in . He currently lives alone but is in process of moving to an assisted living facility.   Here for initial Medical Oncology consultation.   Last colonoscopy was several years ago, unclear how many exactly.   3/31/25: CT Chest: Mild emphysema. Peripheral cystic changes in the lung bases. 6 mm flattened nodule along right minor fissure, which may represent an intrapulmonary lymph node 25: Liver bx: hepatocellular carcinoma, moderately differentiated  [de-identified] : hepatocellular carcinoma, moderately differentiated

## 2025-04-25 NOTE — ASSESSMENT
[SIRT Procedure & Planning] : SIRT procedure and planning discussed at length [FreeTextEntry1] : This is an 86 y/o M with hx of HTN, HLD, OA, lumbar radiculopathy, hearing loss with hearing aids, depression, Hepatitis C (tx 2019 with Harvoni) with image identified liver mass concerning for malignancy, who presents today for consultation for liver directed therapy.   1.  HCC - pt with hospitalized for dizziness, nausea and vomiting in 2/2025, incidentally noted to have liver lesion on workup - 3/14/25 MR a/p demonstrated:  > lesion in segments 4A/B corresponding with the lesion on prior CT demonstrates persistent peripheral enhancement with restricted diffusion. Findings are suspicious for cholangiocarcinoma. The differential may also include a single metastasis. - s/p bx on 4/11/25, path + for HCC - imaging was reviewed a great length with the pt and son Cornelius - Recommending treatment with radioembolization - I reviewed the procedure, including the mapping angiogram, risks (infection, bleeding, elevated LFTs, SUMIT), benefits, alternatives, and expected post procedure course. - I reviewed post procedure radiation safety precautions - AFP 7.3, up from 2019, will repeat prior to procedure - was taking baby aspirin prophylactically, but unsure why he was taking and who prescribed (denies MI, TIA, CVA).  - Recently stopped taking and does not have plans to restart.  - continue to follow with Dr. Good - has initial evaluation with hepatology, Dr. Meeks, later this month   2. Mild Cognitive Impairment - pt a&ox3 with some forgetfulness regarding timing of medical events - independent ADLs - Wilfredo Shields is HCP-present for consult and advised to be present DOS for consent process  I have provided the patient the opportunity to ask questions and have answered them to their satisfaction.  They are encouraged to contact our office with any further questions, concerns, or issues.

## 2025-04-25 NOTE — PHYSICAL EXAM
[Alert] : alert [No Acute Distress] : no acute distress [Well Nourished] : well nourished [Well Developed] : well developed [Healthy Appearance] : healthy appearance [Normal Voice/Communication] : normal voice communication [No Respiratory Distress] : no respiratory distress [Oriented x3] : oriented to person, place, and time [Normal Insight/Judgement] : insight and judgment were intact [Normal Affect] : the affect was normal [Normal Mood] : the mood was normal [Ambulatory and capable of all selfcare but unable to carry out any work activities] : Ambulatory and capable of all selfcare but unable to carry out any work activities. Up and about more than 50% of waking hours [Normal Rate and Effort] : normal respiratory rhythm and effort [No Accessory Muscle Use] : no accessory muscle use [de-identified] : + memory issues since wife passed, forgetful about timing of events
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
## 2025-04-25 NOTE — DATA REVIEWED
[FreeTextEntry1] : ACC: [id number] EXAM: CT ABDOMEN AND PELVIS IC ORDERED BY: JOHANNA GALEANO  PROCEDURE DATE: 02/19/2025    INTERPRETATION: CLINICAL INFORMATION: Nausea and vomiting, loose stools, lower abdominal pain.  COMPARISON: CT abdomen and pelvis 1/12/2019.  CONTRAST/COMPLICATIONS: IV Contrast: Omnipaque 350 90 cc administered 10 cc discarded Oral Contrast: NONE   PROCEDURE: CT of the Abdomen and Pelvis was performed. Sagittal and coronal reformats were performed.  FINDINGS: LOWER CHEST: Right middle lobe nodule measuring 4 mm (3:5). Mild bibasilar atelectasis. Small air-filled cystic spaces in the posterior periphery of the bilateral lower lobes. Coronary artery calcification. Mild bilateral gynecomastia.  LIVER: Indeterminate lesion demonstrating predominantly mild hypodensity with mild peripheral hyperattenuation located in segment 4b, measures 3.8 x 3.0 cm (3:42), new since 1/12/2019. A focus of hyperattenuation in segment 5/6 measuring 1.6 cm is more conspicuous since 1/12/2019, but is not significantly changed in size (3:43). BILE DUCTS: Normal caliber. GALLBLADDER: Within normal limits. SPLEEN: Within normal limits. PANCREAS: Within normal limits. ADRENALS: Within normal limits. KIDNEYS/URETERS: Bilateral renal cysts and subcentimeter hypodense foci that are too small to characterize. No hydronephrosis.  BLADDER: Bladder stone measuring 8 mm. REPRODUCTIVE ORGANS: Enlarged prostate.  BOWEL: Colonic diverticulosis. Moderate to marked mural thickening extending from the splenic flexure to the mid sigmoid colon with adjacent fat infiltration, concerning for colitis. No evidence of pneumatosis. No bowel obstruction. PERITONEUM/RETROPERITONEUM: Trace ascites. VESSELS: Atherosclerotic changes. No evidence of portal or mesenteric venous gas. LYMPH NODES: No lymphadenopathy. ABDOMINAL WALL: Small fat-containing umbilical hernia. Small fat-containing right inguinal hernia. BONES: Degenerative changes. Grade 1 retrolisthesis of L1 over L2. Grade 1 anterolisthesis of L4 over L5.  IMPRESSION: * Moderate to marked mural thickening with adjacent fat infiltration extending from the splenic flexure to the mid sigmoid colon, concerning for colitis. * Indeterminate liver lesion measuring 3.8 cm that is new since 2019. Evaluation with contrast enhanced abdominal MRI is recommended.     --- End of Report ---     JOHN BUCK MD; Resident Radiologist This document has been electronically signed. MÓNICA MELENDEZ MD; Attending Radiologist This document has been electronically signed. Feb 19 2025 3:40PM

## 2025-04-25 NOTE — REASON FOR VISIT
[Consultation] : a consultation visit [Home] : at home, [unfilled] , at the time of the visit. [Medical Office: ([facility])___] : at the medical office located in  [This encounter was initiated by telehealth (audio with video) and converted to telephone (audio only)] : This encounter was initiated by telehealth (audio with video) and converted to telephone (audio only) [Technical] : patient unable to effectively utilize tele-video due to technical issues. [Verbal consent obtained from patient] : the patient, [unfilled] [Other: _____] : [unfilled] [FreeTextEntry1] : liver directed therapy

## 2025-04-30 NOTE — ASSESSMENT
[FreeTextEntry1] : Patient is an 85-year-old gentleman with a history of dyslipidemia, systemic hypertension hearing loss, depression prior history of hepatitis C treated with Harvoni in 2019, recent diagnosis of liver cancer (biopsy-proven HCC -moderately differentiated) who presents for his initial visit.   CT scan of the abdomen in February 2025 showed  an indeterminate liver lesion measuring 3.8 cm which (new)  Subsequent MRI of the liver in March 2025 showed liver morphology, and lesion in segment 4A and B measuring 3.6 x 2.9 cm restricts diffusion and demonstrates peripheral enhancement with retention of contrast on delayed phase.  Lesion in segment 6 as measured at 1.6 cm compatible with a flash filling hemangioma.  Findings initially concerning for cholangiocarcinoma but he has since undergone the lesion biopsy which confirms diagnosis of moderately differentiated hepatocellular carcinoma.    CT chest with no clear metastatic lesions other does mention a 6 mm flattened nodule along the right minor fissure He is planned for a visit with Dr. Verma (Liver Oncology surgeon).  Within Sunil with no synthetic dysfunction.  Has 2 options -surgical approach for resection versus LRT.  Would favor LRT given his age but will discuss his case at Tumor board meeting next week. ECOG 1 Meanwhile will obtain NM bone scan. AFP 7.3.   RTO in 3 months.

## 2025-04-30 NOTE — PHYSICAL EXAM
[Average] : average [Cooperative] : cooperative [Depressed] : depressed [Full] : full [Clear] : clear [Linear/Goal Directed] : linear/goal directed [WNL] : within normal limits [FreeTextEntry7] : No SI or HI [de-identified] : No auditory or visual hallucinations

## 2025-04-30 NOTE — DISCUSSION/SUMMARY
[FreeTextEntry1] : Patient was in a depressed mood with a full affect. Patient was forthcoming about his feelings in the session. Patient spoke about a recent diagnosis of liver cancer. Therapist offered empathic listening and support. Patient expressed his feelings about having family support from his sons and granddaughter. Patient feels frustrated with his inability to remember events and difficulty to think of the word he would like to use. No SI/HI/plan. No auditory or visual hallucinations. Patient will return next week, unless he has a conflict with his medical procedure for liver cancer.

## 2025-04-30 NOTE — HISTORY OF PRESENT ILLNESS
[FreeTextEntry1] : Patient is an 85-year-old gentleman with a history of dyslipidemia, systemic hypertension hearing loss, depression prior history of hepatitis C treated with Harvoni in 2019, recent diagnosis of liver cancer (biopsy-proven HCC -moderately differentiated) who presents for his initial visit.   Appears to previously have followed with Dr. Carvajal.  At today's visit, he is accompanied by his son, Cornelius.  He originally underwent CT scan of the abdomen in February 2025 after he presented to the hospital with nausea vomiting and dizziness.  The CT scan showed concerns for colitis and an indeterminate liver lesion measuring 3.8 cm which was new when compared with his last imaging in 2019. Subsequent MRI of the liver in March 2025 showed liver morphology, and lesion in segment 4A and B measuring 3.6 x 2.9 cm restricts diffusion and demonstrates peripheral enhancement with retention of contrast on delayed phase.  Lesion in segment 6 as measured at 1.6 cm compatible with a flash filling hemangioma.  Findings initially concerning for cholangiocarcinoma but he has since undergone the lesion biopsy which confirms diagnosis of moderately differentiated hepatocellular carcinoma.  He has seen Dr. Liz Good (oncologist) and IR (Dr. Baker).  They recommend liver directed radiation therapy.  CT chest with no clear metastatic lesions other does mention a 6 mm flattened nodule along the right minor fissure He is planned for a visit with Dr. Verma (Liver Oncology surgeon).  He has no known history of cardiac disease and his synthetic function is preserved on most recent labs. Previously on aspirin which has since been discontinued. Prior history of smoking for over 50 years but quit at age 70 (1 pack/day).  Rarely drinks alcohol Lives with his dog.  Lost his wife about a year and a half ago and is being treated for depression.  He has 4 sons and 2 of his sons live close to him.   Onc: Florentino PMD: FLORY Bui IR: Dr. Baker

## 2025-04-30 NOTE — PHYSICAL EXAM
[General Appearance - Alert] : alert [General Appearance - In No Acute Distress] : in no acute distress [FreeTextEntry1] : Frail but thriviing  [] : no respiratory distress [Respiration, Rhythm And Depth] : normal respiratory rhythm and effort [Exaggerated Use Of Accessory Muscles For Inspiration] : no accessory muscle use [Heart Rate And Rhythm] : heart rate was normal and rhythm regular [Heart Sounds] : normal S1 and S2 [Bowel Sounds] : normal bowel sounds [Abdomen Soft] : soft [Abdomen Tenderness] : non-tender [Oriented To Time, Place, And Person] : oriented to person, place, and time [Impaired Insight] : insight and judgment were intact

## 2025-05-01 NOTE — ASSESSMENT
[FreeTextEntry1] : # Actinic Keratoses, scalp - Discussed the nature and usual course of these lesions  - Discussed treatment options and r/b/a of treatment  - Will treat with LN2 today    Procedure note: Cryotherapy  Verbal consent obtained. The risks/benefits/alternatives discussed including but not limited to risk of pain, inflammatory and blistering reaction, infection, risk of permanent scar and/or dyspigmentation, recurrence, possible lack of efficacy and need for repeat treatments. Sites confirmed.  4 lesion(s) treated with cryotherapy: liquid nitrogen x2 rapid freeze-slow thaw cycles. Discussed wound care instructions. Patient tolerated well with no immediate complications. - could consider efudex field treatment in future  # Neoplasms of Uncertain Behaviors x2 on vertex scalp  Ddx HAK vs SCC - Bx as below to ascertain dx. See procedure note below - Wound care instructions given. Recommended OTC vaseline for wound care  Procedure: Shave bx  1) Anterior vertex scalp  2) Posterior vertex scalp  The risks/benefits/alternatives of skin biopsy were explained to the patient, which include and are not limited to bleeding, infection, scarring or discoloration of skin, and recurrence of lesion. Patient expressed understanding of these risks and provided consent to the procedure. Time out with verification of patient and lesion site was performed. Site was prepped with rubbing alcohol, lidocaine with epinephrine was injected for anesthesia, and biopsy was performed. Specimen sent to path. Procedure was without complication and well tolerated. Wound care was discussed.  #SKs #Lentigines - education, counseling, reassurance - otc spf 30+ - rtc sooner if changing  #Hx of condyloma, groin - biopsy proven - opts to continue to monitor, not sexually active, defers further tx   RTC 6 months or sooner PRN pending bx results

## 2025-05-01 NOTE — HISTORY OF PRESENT ILLNESS
[FreeTextEntry1] : fu AKs  [de-identified] : AKSHAT CARRANZA is a 85-year-old male who presents for eval of:   1. AKs on the scalp, LV 3/2025 had AKs tx with LN2 and one on vertex scalp biopsied  2. Lesion in the groin, present ~1 year, slightly itchy - bx confirmed condyloma, pt uninterested in treating at this time   No personal or family hx skin CA

## 2025-05-01 NOTE — PHYSICAL EXAM
[FreeTextEntry3] : Focused exam only (see below) per patient request: - gritty erythematous scaly papules on the scalp    - hyperkeratotic erythematous papules anterior vertex scalp (1) and posterior vertex scalp (2)  - scalp with scattered brown macules and waxy papules. dermoscopy with benign features - declines groin exam

## 2025-05-06 NOTE — PHYSICAL EXAM
[Average] : average [Cooperative] : cooperative [Depressed] : depressed [Full] : full [Clear] : clear [Linear/Goal Directed] : linear/goal directed [WNL] : within normal limits [FreeTextEntry7] : No SI or HI [de-identified] : No auditory or visual hallucinations

## 2025-05-06 NOTE — PLAN
[Cognitive and/or Behavior Therapy] : Cognitive and/or Behavior Therapy  [Supportive Therapy] : Supportive Therapy [Every ___ week(s)] : Psychotherapy: Every [unfilled] week(s) [Individual Therapy] : Individual Therapy [FreeTextEntry4] : Patient will develop a therapeutic alliance with therapist Patient will identify and process feelings of grief Patient will learn grounding tools to cope with depression Patient's son will be involved in patient's treatment

## 2025-05-06 NOTE — DISCUSSION/SUMMARY
[FreeTextEntry1] : Patient was in a euthymic mood with a full affect. Patient was well engaged in the session. 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 Therapist acknowledged his book and validated the thought he put into it. Patient verbalized gratitude for his life. He will be receiving treatment for liver cancer. Patient does not know when treatment will begin. Patient denied feeling upset about it. He is unsure what he would like to address in therapy. No SI/HI/plan. No auditory or visual hallucinations. Therapist offered support. Patient will return next week.

## 2025-05-13 NOTE — PLAN
[Cognitive and/or Behavior Therapy] : Cognitive and/or Behavior Therapy  [Supportive Therapy] : Supportive Therapy [Other: ____] : [unfilled] [Every ___ week(s)] : Psychotherapy: Every [unfilled] week(s) [Individual Therapy] : Individual Therapy [FreeTextEntry4] : Patient will develop a therapeutic alliance with therapist Patient will identify and process feelings of grief Patient will learn grounding tools to cope with depression Patient's son will be involved in patient's treatment

## 2025-05-13 NOTE — PHYSICAL EXAM
[Average] : average [Anxious] : anxious [Full] : full [Clear] : clear [Linear/Goal Directed] : linear/goal directed [WNL] : within normal limits [FreeTextEntry7] : No SI or HI [de-identified] : No auditory or visual hallucinations

## 2025-05-13 NOTE — DISCUSSION/SUMMARY
[FreeTextEntry1] : Therapist called patient, who did not have the appointment on his schedule today. Therapist provided patient the schedule during the last appointment. Patient was in an anxious mood with a full affect. He questioned the purpose of therapy sessions, despite his past assertion that he needed therapy. Therapist explored further with patient. Patient is having a hard time accepting his memory loss. Therapist offered support and empathic listening. Patient cancelled the following appointment with therapist. He will have a procedure to treat liver cancer on 5/14. Patient does not know how long his recovery will be. Therapist wished him well. Patient will contact therapist for the next appointment. Therapist was unable to reach his son, Cornelius. Patient originally stated that Cornelius is his power of . During the session, patient stated that Cornelius is not his power of , and then he said "I don't know". Therapist provided patient contact information for Cornelius to contact writer.

## 2025-05-22 NOTE — HISTORY OF PRESENT ILLNESS
[FreeTextEntry1] : Mohs consultation for superficially infiltrative SCC on anterior and posterior vertex scalp [de-identified] : 05/21/2025   Referred by: Dr. Ibarra    Mr. AKSHAT CARRANZA is a 85 year old M who presents for consultation for Mohs Micrographic Surgery superficially infiltrative SCC on anterior and posterior vertex scalp. Unsure when the spots appear, notes picking at scalp.   Skin cancer history: No previous skin cancers PMH: No sig PMH SH: Retired [employer] . Lives in [place] Upcoming travel plans: None Pertinent positives noted below   History of immunosuppression, radiation, or cancer:No History of HIV or hepatitis: No Blood thinners: None Antibiotic Prophylaxis: None Medical implants: None The patient's review of systems questionnaire was reviewed. Education needs were identified. There were no barriers to learning.

## 2025-05-22 NOTE — PHYSICAL EXAM
[Alert] : alert [Oriented x 3] : ~L oriented x 3 [Well Nourished] : well nourished [Conjunctiva Non-injected] : conjunctiva non-injected [No Visual Lymphadenopathy] : no visual  lymphadenopathy [No Clubbing] : no clubbing [No Edema] : no edema [No Bromhidrosis] : no bromhidrosis [No Chromhidrosis] : no chromhidrosis [FreeTextEntry3] : - L anterior vertex scalp with healing biopsy site about 1cm and overlying heme crusting, 15.5 cm from R medial brow, 13.5 cm from root of R helix  - L posterior vertex scalp with healed biopsy site about 0.5cm 18 cm from R medial brow, 14.5 cm from root of R helix

## 2025-05-22 NOTE — HISTORY OF PRESENT ILLNESS
[FreeTextEntry1] : Mohs consultation for superficially infiltrative SCC on anterior and posterior vertex scalp [de-identified] : 05/21/2025   Referred by: Dr. Ibarra    Mr. AKSHAT CARRANZA is a 85 year old M who presents for consultation for Mohs Micrographic Surgery superficially infiltrative SCC on anterior and posterior vertex scalp. Unsure when the spots appear, notes picking at scalp.   Skin cancer history: No previous skin cancers PMH: No sig PMH SH: Retired [employer] . Lives in [place] Upcoming travel plans: None Pertinent positives noted below   History of immunosuppression, radiation, or cancer:No History of HIV or hepatitis: No Blood thinners: None Antibiotic Prophylaxis: None Medical implants: None The patient's review of systems questionnaire was reviewed. Education needs were identified. There were no barriers to learning.

## 2025-06-10 NOTE — HISTORY OF PRESENT ILLNESS
[FreeTextEntry1] : Mohs surgery for superficially infiltrative SCC on anterior and posterior vertex scalp [de-identified] : 06/10/2025  Referred by: Dr. Ibarra    Mr. AKSHAT CARRANZA is a 85 year old M who presents for Mohs Micrographic Surgery superficially infiltrative SCC on A.  anterior and  B. posterior vertex scalp.  Consult done 05/21/2025  Unsure when the spots appear, notes picking at scalp. Both spots were bx at the same time Plan to do the anterior vertex scalp lesion today    Skin cancer history: No previous skin cancers PMH: No sig PMH SH: Retired [employer] . Lives in [place]. Has a dog Bonnie, 10 years old Upcoming travel plans: None Pertinent positives noted below   History of immunosuppression, radiation, or cancer:No History of HIV or hepatitis: No Blood thinners: None Antibiotic Prophylaxis: None Medical implants: None The patient's review of systems questionnaire was reviewed. Education needs were identified. There were no barriers to learning.

## 2025-06-10 NOTE — PHYSICAL EXAM
[Alert] : alert [Oriented x 3] : ~L oriented x 3 [Well Nourished] : well nourished [No Visual Lymphadenopathy] : no visual  lymphadenopathy [Conjunctiva Non-injected] : conjunctiva non-injected [No Clubbing] : no clubbing [No Edema] : no edema [No Chromhidrosis] : no chromhidrosis [No Bromhidrosis] : no bromhidrosis [FreeTextEntry3] : - L anterior vertex scalp with healing biopsy site about 1cm and overlying heme crusting, 15.5 cm from R medial brow, 13.5 cm from root of R helix  - L posterior vertex scalp with healed biopsy site about 0.5cm 18 cm from R medial brow, 14.5 cm from root of R helix. Immediately posterior to above lesion - No regional LAD

## 2025-06-10 NOTE — PHYSICAL EXAM
[Alert] : alert [Well Nourished] : well nourished [Oriented x 3] : ~L oriented x 3 [Conjunctiva Non-injected] : conjunctiva non-injected [No Visual Lymphadenopathy] : no visual  lymphadenopathy [No Clubbing] : no clubbing [No Edema] : no edema [No Chromhidrosis] : no chromhidrosis [No Bromhidrosis] : no bromhidrosis [FreeTextEntry3] : - L anterior vertex scalp with healing biopsy site about 1cm and overlying heme crusting, 15.5 cm from R medial brow, 13.5 cm from root of R helix  - L posterior vertex scalp with healed biopsy site about 0.5cm 18 cm from R medial brow, 14.5 cm from root of R helix. Immediately posterior to above lesion - No regional LAD

## 2025-06-10 NOTE — HISTORY OF PRESENT ILLNESS
[de-identified] : 06/10/2025  Referred by: Dr. Ibarra    Mr. AKSHAT CARRANZA is a 85 year old M who presents for Mohs Micrographic Surgery superficially infiltrative SCC on A.  anterior and  B. posterior vertex scalp.  Consult done 05/21/2025  Unsure when the spots appear, notes picking at scalp. 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 Has a dog Bonnie, 10 years old Upcoming travel plans: None Pertinent positives noted below   History of immunosuppression, radiation, or cancer:No History of HIV or hepatitis: No Blood thinners: None Antibiotic Prophylaxis: None Medical implants: None The patient's review of systems questionnaire was reviewed. Education needs were identified. There were no barriers to learning. [FreeTextEntry1] : Mohs surgery for superficially infiltrative SCC on anterior and posterior vertex scalp

## 2025-06-24 NOTE — PHYSICAL EXAM
[Average] : average [Cooperative] : cooperative [Depressed] : depressed [Constricted] : constricted [Clear] : clear [Linear/Goal Directed] : linear/goal directed [WNL] : within normal limits [FreeTextEntry7] : No SI or HI [de-identified] : No auditory or visual hallucinations

## 2025-06-24 NOTE — ASSESSMENT
[FreeTextEntry1] : The patient returns for wound check and suture removal post-operatively. Wound is healing well. Sutures were removed. Vaseline and a bandage were applied. The patient was counseled to continue vaseline application for at least 1 additional week or until healed.  We have counseled the patient to follow up with their regular dermatologist for routine skin screening visits as previously recommended.

## 2025-06-24 NOTE — DISCUSSION/SUMMARY
[FreeTextEntry1] : Patient was in a depressed mood with a constricted affect. Patient was forthcoming about his feelings in the session. Patient spoke about his medical issues. Therapist offered support. Patient stated that writer is not guiding him in therapy sessions, and he is not feeling the benefit. Therapist gave patient psycho education on the need to attend sessions regularly and the role of therapy. Patient verbalized understanding. No SI/HI/plan. No auditory or visual hallucinations. Patient will return next week.

## 2025-06-25 NOTE — REVIEW OF SYSTEMS
[Diarrhea: Grade 0] : Diarrhea: Grade 0 [Depression] : depression [Negative] : Allergic/Immunologic [de-identified] : memory issues

## 2025-06-25 NOTE — REVIEW OF SYSTEMS
[Diarrhea: Grade 0] : Diarrhea: Grade 0 [Depression] : depression [Negative] : Allergic/Immunologic [de-identified] : memory issues

## 2025-06-25 NOTE — HISTORY OF PRESENT ILLNESS
[Disease: _____________________] : Disease: [unfilled] [T: ___] : T[unfilled] [N: ___] : N[unfilled] [M: ___] : M[unfilled] [AJCC Stage: ____] : AJCC Stage: [unfilled] [de-identified] : 86 y/o M PMHx Hepatitis C (tx 2019 with Deja) referred for evaluation of locally advanced HCC.    PMHx: hearing loss with hearing aides, HTN, HLD, OA, lumbar radiculopathy  Asim presented to ED on 25 for dizziness, nausea and vomiting. CT AP 25 showed Moderate to marked mural thickening with adjacent fat infiltration extending from the splenic flexure to the mid sigmoid colon, concerning for colitis and indeterminate liver lesion measuring 3.8 cm that is new since 2019 w/ recommendations for contrast enhanced MRI. Patient's PCP ordered MRI outpatient that was performed 3/13/25 showing Lesion in segments 4A/B corresponding with the lesion on prior CT demonstrates persistent peripheral enhancement with restricted diffusion. Findings are suspicious for cholangiocarcinoma. The differential may also include a single metastasis. A 1.6 cm small segment 6 hemangioma is also noted. Pt denies any change in BMs, appetite or energy level. He has been struggling with depression and memory issues since his wife  in . He currently lives alone but is in process of moving to an assisted living facility.   Here for initial Medical Oncology consultation.   Last colonoscopy was several years ago, unclear how many exactly.   3/31/25: CT Chest: Mild emphysema. Peripheral cystic changes in the lung bases. 6 mm flattened nodule along right minor fissure, which may represent an intrapulmonary lymph node 25: Liver bx: hepatocellular carcinoma, moderately differentiated  25: SIRT [de-identified] : hepatocellular carcinoma, moderately differentiated  [FreeTextEntry1] : s/p SIRT  [de-identified] : overall feeling well. had Y90 June 4th which he tolerated well.  good appetite, energy stable.  denies any pain.  has a f/u with IR next week

## 2025-06-25 NOTE — HISTORY OF PRESENT ILLNESS
[Disease: _____________________] : Disease: [unfilled] [T: ___] : T[unfilled] [N: ___] : N[unfilled] [M: ___] : M[unfilled] [AJCC Stage: ____] : AJCC Stage: [unfilled] [de-identified] : 86 y/o M PMHx Hepatitis C (tx 2019 with Deja) referred for evaluation of locally advanced HCC.    PMHx: hearing loss with hearing aides, HTN, HLD, OA, lumbar radiculopathy  Asim presented to ED on 25 for dizziness, nausea and vomiting. CT AP 25 showed Moderate to marked mural thickening with adjacent fat infiltration extending from the splenic flexure to the mid sigmoid colon, concerning for colitis and indeterminate liver lesion measuring 3.8 cm that is new since 2019 w/ recommendations for contrast enhanced MRI. Patient's PCP ordered MRI outpatient that was performed 3/13/25 showing Lesion in segments 4A/B corresponding with the lesion on prior CT demonstrates persistent peripheral enhancement with restricted diffusion. Findings are suspicious for cholangiocarcinoma. The differential may also include a single metastasis. A 1.6 cm small segment 6 hemangioma is also noted. Pt denies any change in BMs, appetite or energy level. He has been struggling with depression and memory issues since his wife  in . He currently lives alone but is in process of moving to an assisted living facility.   Here for initial Medical Oncology consultation.   Last colonoscopy was several years ago, unclear how many exactly.   3/31/25: CT Chest: Mild emphysema. Peripheral cystic changes in the lung bases. 6 mm flattened nodule along right minor fissure, which may represent an intrapulmonary lymph node 25: Liver bx: hepatocellular carcinoma, moderately differentiated  25: SIRT [de-identified] : hepatocellular carcinoma, moderately differentiated  [FreeTextEntry1] : s/p SIRT  [de-identified] : overall feeling well. had Y90 June 4th which he tolerated well.  good appetite, energy stable.  denies any pain.  has a f/u with IR next week

## 2025-07-01 NOTE — PLAN
[Supportive Therapy] : Supportive Therapy [Other: ____] : [unfilled] [Every ___ week(s)] : Psychotherapy: Every [unfilled] week(s) [Individual Therapy] : Individual Therapy [FreeTextEntry4] : Patient will develop a therapeutic alliance with therapist Patient will identify and process feelings of grief Patient will learn grounding tools to cope with depression Patient's son will be involved in patient's treatment

## 2025-07-01 NOTE — DISCUSSION/SUMMARY
[FreeTextEntry1] : Patient was in a depressed mood with a constricted affect. Patient was well engaged in the session. Patient spoke about his late wife and how he misses her. Patient spoke about his decision to sell his home. He is still in the preparation stage. Therapist explored with patient. He looks forward to downsizing. Patient spoke about wanting companionship. He has been cordial with his ex-wife. No SI/HI/plan. No auditory or visual hallucinations. Patient will return in 2 weeks. Therapist is off on 7/7/2025. Therapist provided patient an appointment card. He verbalized understanding.

## 2025-07-01 NOTE — PHYSICAL EXAM
[Average] : average [Cooperative] : cooperative [Depressed] : depressed [Constricted] : constricted [Clear] : clear [Linear/Goal Directed] : linear/goal directed [WNL] : within normal limits [FreeTextEntry7] : No SI or HI [de-identified] : No auditory or visual hallucinations

## 2025-07-02 NOTE — PHYSICAL EXAM
[Alert] : alert [No Acute Distress] : no acute distress [Well Nourished] : well nourished [Well Developed] : well developed [Healthy Appearance] : healthy appearance [Normal Voice/Communication] : normal voice communication [No Respiratory Distress] : no respiratory distress [Normal Rate and Effort] : normal respiratory rhythm and effort [No Accessory Muscle Use] : no accessory muscle use [Oriented x3] : oriented to person, place, and time [Normal Insight/Judgement] : insight and judgment were intact [Normal Affect] : the affect was normal [Normal Mood] : the mood was normal [Ambulatory and capable of all selfcare but unable to carry out any work activities] : Ambulatory and capable of all selfcare but unable to carry out any work activities. Up and about more than 50% of waking hours [de-identified] : + memory issues since wife passed, forgetful about timing of events

## 2025-07-02 NOTE — ASSESSMENT
[SIRT Procedure & Planning] : SIRT procedure and planning discussed at length [FreeTextEntry1] : This is an 84 y/o M with hx of HTN, HLD, OA, lumbar radiculopathy, hearing loss with hearing aids, depression, Hepatitis C (tx 2019 with Harvoni) with image identified liver mass concerning for malignancy, who presents today for follow up s/p liver directed therapy.  1.  HCC - pt with hospitalized for dizziness, nausea and vomiting in 2/2025, incidentally noted to have liver lesion on workup - 3/14/25 MR a/p demonstrated:  > lesion in segments 4A/B corresponding with the lesion on prior CT demonstrates persistent peripheral enhancement with restricted diffusion. Findings are suspicious for cholangiocarcinoma. The differential may also include a single metastasis. - s/p bx on 4/11/25, path + for HCC - now s/p segment 4a/b s/p SIRT on 6/4/25 - post op he reports feeling well, currently asymptomatic - did not obtain post op labs- advised to obtain labs for review - MR in 2 mos (ordered by Dr. Good 8/2025) with IR f/u after - continue to follow with Dr. Good - has appt with Dr. Meeks 8/6/25   2. Mild Cognitive Impairment - pt a&ox3 with some forgetfulness regarding timing of medical events - independent ADLs - Son Cornelius is HCP-present for f/u  I have provided the patient the opportunity to ask questions and have answered them to their satisfaction.  They are encouraged to contact our office with any further questions, concerns, or issues.  Above discussed with Dr. Baker who is in agreement with plan of care.

## 2025-07-02 NOTE — REASON FOR VISIT
[Follow-Up: _____] : a [unfilled] follow-up visit [Other: _____] : [unfilled] [Home] : at home, [unfilled] , at the time of the visit. [Medical Office: ([facility])___] : at the medical office located in  [This encounter was initiated by telehealth (audio with video) and converted to telephone (audio only)] : This encounter was initiated by telehealth (audio with video) and converted to telephone (audio only) [Technical] : patient unable to effectively utilize tele-video due to technical issues. [Verbal consent obtained from patient] : the patient, [unfilled] [Family Member] : family member

## 2025-07-02 NOTE — REASON FOR VISIT
[Follow-Up: _____] : a [unfilled] follow-up visit [Other: _____] : [unfilled] [Home] : at home, [unfilled] , at the time of the visit. [Medical Office: ([facility])___] : at the medical office located in  [This encounter was initiated by telehealth (audio with video) and converted to telephone (audio only)] : This encounter was initiated by telehealth (audio with video) and converted to telephone (audio only) [Technical] : patient unable to effectively utilize tele-video due to technical issues. [Verbal consent obtained from patient] : the patient, [unfilled] [Family Member] : family member
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
## 2025-07-02 NOTE — PHYSICAL EXAM
[Alert] : alert [No Acute Distress] : no acute distress [Well Nourished] : well nourished [Well Developed] : well developed [Healthy Appearance] : healthy appearance [Normal Voice/Communication] : normal voice communication [No Respiratory Distress] : no respiratory distress [Normal Rate and Effort] : normal respiratory rhythm and effort [No Accessory Muscle Use] : no accessory muscle use [Oriented x3] : oriented to person, place, and time [Normal Insight/Judgement] : insight and judgment were intact [Normal Affect] : the affect was normal [Normal Mood] : the mood was normal [Ambulatory and capable of all selfcare but unable to carry out any work activities] : Ambulatory and capable of all selfcare but unable to carry out any work activities. Up and about more than 50% of waking hours [de-identified] : + memory issues since wife passed, forgetful about timing of events

## 2025-07-02 NOTE — ASSESSMENT
[SIRT Procedure & Planning] : SIRT procedure and planning discussed at length [FreeTextEntry1] : This is an 86 y/o M with hx of HTN, HLD, OA, lumbar radiculopathy, hearing loss with hearing aids, depression, Hepatitis C (tx 2019 with Harvoni) with image identified liver mass concerning for malignancy, who presents today for follow up s/p liver directed therapy.  1.  HCC - pt with hospitalized for dizziness, nausea and vomiting in 2/2025, incidentally noted to have liver lesion on workup - 3/14/25 MR a/p demonstrated:  > lesion in segments 4A/B corresponding with the lesion on prior CT demonstrates persistent peripheral enhancement with restricted diffusion. Findings are suspicious for cholangiocarcinoma. The differential may also include a single metastasis. - s/p bx on 4/11/25, path + for HCC - now s/p segment 4a/b s/p SIRT on 6/4/25 - post op he reports feeling well, currently asymptomatic - did not obtain post op labs- advised to obtain labs for review - MR in 2 mos (ordered by Dr. Good 8/2025) with IR f/u after - continue to follow with Dr. Good - has appt with Dr. Meeks 8/6/25   2. Mild Cognitive Impairment - pt a&ox3 with some forgetfulness regarding timing of medical events - independent ADLs - Son Cornelius is HCP-present for f/u  I have provided the patient the opportunity to ask questions and have answered them to their satisfaction.  They are encouraged to contact our office with any further questions, concerns, or issues.  Above discussed with Dr. Baker who is in agreement with plan of care.

## 2025-07-15 NOTE — PHYSICAL EXAM
[Average] : average [Cooperative] : cooperative [Depressed] : depressed [Constricted] : constricted [Clear] : clear [Linear/Goal Directed] : linear/goal directed [WNL] : within normal limits [FreeTextEntry7] : No SI or HI [de-identified] : No auditory or visual hallucinations

## 2025-07-15 NOTE — DISCUSSION/SUMMARY
[FreeTextEntry1] : Patient was a little late to session and was flustered. He pulled into a different parking lot and thought the appointment was in a different building. Therapist offered to do a grounding exercise, which patient declined. Patient brought in pictures that he took from the same place in all four seasons. Patient was very proud of his work. Therapist expressed appreciation for sharing his work. Patient was in a depressed mood with a constricted affect. Patient spoke about having a very supportive family. He would like to remain in his house. No SI/HI/plan. No auditory or visual hallucinations. Patient will return next week. Writer provided an appointment card for patient.

## 2025-07-15 NOTE — END OF VISIT
[Individual Psychotherapy for 38-52 minutes] : Individual Psychotherapy for 38 - 52 minutes [Duration of Psychotherapy Visit (minutes spent in synchronous communication): ____] : Duration of Psychotherapy Visit (minutes spent in synchronous communication): [unfilled]

## 2025-07-22 NOTE — PHYSICAL EXAM
[Unkept] : unkept [Average] : average [Cooperative] : cooperative [Depressed] : depressed [Constricted] : constricted [Clear] : clear [Soft] : soft [Linear/Goal Directed] : linear/goal directed [WNL] : within normal limits [FreeTextEntry7] : No SI or HI [de-identified] : No auditory or visual hallucinations

## 2025-07-22 NOTE — DISCUSSION/SUMMARY
[FreeTextEntry1] : Patient was in a depressed mood with a constricted affect. Patient was forthcoming about his feelings in the session. Patient spoke about missing his wife. Patient reported that he lost interest in activities that interested him in the past. Therapist offered empathic listening and support. Patient spoke about art in his home and expressed his desire for therapist to visit him and "really get to know him". Therapist validated patient's feelings and stated that it would not be appropriate for the nature of this relationship. Therapist acknowledged that this is very important to him. Patient appeared sad. No SI/HI/plan. No auditory or visual hallucinations. Therapist informed patient the next appointment will be on 8/4/2025, due to writer being off on 7/28. Therapist called patient's son, Cornelius Ballesteros, and also informed him of the next appointment date.